# Patient Record
Sex: FEMALE | Race: WHITE | NOT HISPANIC OR LATINO | Employment: FULL TIME | URBAN - METROPOLITAN AREA
[De-identification: names, ages, dates, MRNs, and addresses within clinical notes are randomized per-mention and may not be internally consistent; named-entity substitution may affect disease eponyms.]

---

## 2022-11-10 ENCOUNTER — HOSPITAL ENCOUNTER (OUTPATIENT)
Dept: RADIOLOGY | Facility: HOSPITAL | Age: 47
Discharge: HOME | End: 2022-11-10
Attending: PHYSICIAN ASSISTANT
Payer: COMMERCIAL

## 2022-11-10 ENCOUNTER — OFFICE VISIT (OUTPATIENT)
Dept: SPORTS MEDICINE | Facility: CLINIC | Age: 47
End: 2022-11-10
Payer: COMMERCIAL

## 2022-11-10 VITALS
HEIGHT: 62 IN | HEART RATE: 55 BPM | WEIGHT: 124 LBS | DIASTOLIC BLOOD PRESSURE: 73 MMHG | SYSTOLIC BLOOD PRESSURE: 107 MMHG | BODY MASS INDEX: 22.82 KG/M2

## 2022-11-10 DIAGNOSIS — M79.661 PAIN IN RIGHT SHIN: ICD-10-CM

## 2022-11-10 DIAGNOSIS — M79.89 PAIN AND SWELLING OF RIGHT LOWER LEG: ICD-10-CM

## 2022-11-10 DIAGNOSIS — M25.571 RIGHT ANKLE PAIN, UNSPECIFIED CHRONICITY: Primary | ICD-10-CM

## 2022-11-10 DIAGNOSIS — M79.661 PAIN AND SWELLING OF RIGHT LOWER LEG: ICD-10-CM

## 2022-11-10 DIAGNOSIS — M25.571 RIGHT ANKLE PAIN, UNSPECIFIED CHRONICITY: ICD-10-CM

## 2022-11-10 PROCEDURE — 73590 XR TIBIA FIBULA 2 VIEW RIGHT: ICD-10-PCS | Mod: 26,RT,, | Performed by: RADIOLOGY

## 2022-11-10 PROCEDURE — 3078F PR MOST RECENT DIASTOLIC BLOOD PRESSURE < 80 MM HG: ICD-10-PCS | Mod: CPTII,S$GLB,, | Performed by: PHYSICIAN ASSISTANT

## 2022-11-10 PROCEDURE — 99999 PR PBB SHADOW E&M-EST. PATIENT-LVL III: ICD-10-PCS | Mod: PBBFAC,,, | Performed by: PHYSICIAN ASSISTANT

## 2022-11-10 PROCEDURE — 99204 OFFICE O/P NEW MOD 45 MIN: CPT | Mod: S$GLB,,, | Performed by: PHYSICIAN ASSISTANT

## 2022-11-10 PROCEDURE — 1159F PR MEDICATION LIST DOCUMENTED IN MEDICAL RECORD: ICD-10-PCS | Mod: CPTII,S$GLB,, | Performed by: PHYSICIAN ASSISTANT

## 2022-11-10 PROCEDURE — 73590 X-RAY EXAM OF LOWER LEG: CPT | Mod: TC,RT

## 2022-11-10 PROCEDURE — 73610 XR ANKLE COMPLETE 3 VIEW RIGHT: ICD-10-PCS | Mod: 26,RT,, | Performed by: RADIOLOGY

## 2022-11-10 PROCEDURE — 73610 X-RAY EXAM OF ANKLE: CPT | Mod: 26,RT,, | Performed by: RADIOLOGY

## 2022-11-10 PROCEDURE — 1159F MED LIST DOCD IN RCRD: CPT | Mod: CPTII,S$GLB,, | Performed by: PHYSICIAN ASSISTANT

## 2022-11-10 PROCEDURE — 3074F PR MOST RECENT SYSTOLIC BLOOD PRESSURE < 130 MM HG: ICD-10-PCS | Mod: CPTII,S$GLB,, | Performed by: PHYSICIAN ASSISTANT

## 2022-11-10 PROCEDURE — 99999 PR PBB SHADOW E&M-EST. PATIENT-LVL III: CPT | Mod: PBBFAC,,, | Performed by: PHYSICIAN ASSISTANT

## 2022-11-10 PROCEDURE — 99204 PR OFFICE/OUTPT VISIT, NEW, LEVL IV, 45-59 MIN: ICD-10-PCS | Mod: S$GLB,,, | Performed by: PHYSICIAN ASSISTANT

## 2022-11-10 PROCEDURE — 1160F RVW MEDS BY RX/DR IN RCRD: CPT | Mod: CPTII,S$GLB,, | Performed by: PHYSICIAN ASSISTANT

## 2022-11-10 PROCEDURE — 73610 X-RAY EXAM OF ANKLE: CPT | Mod: TC,RT

## 2022-11-10 PROCEDURE — 3008F PR BODY MASS INDEX (BMI) DOCUMENTED: ICD-10-PCS | Mod: CPTII,S$GLB,, | Performed by: PHYSICIAN ASSISTANT

## 2022-11-10 PROCEDURE — 3078F DIAST BP <80 MM HG: CPT | Mod: CPTII,S$GLB,, | Performed by: PHYSICIAN ASSISTANT

## 2022-11-10 PROCEDURE — 3008F BODY MASS INDEX DOCD: CPT | Mod: CPTII,S$GLB,, | Performed by: PHYSICIAN ASSISTANT

## 2022-11-10 PROCEDURE — 73590 X-RAY EXAM OF LOWER LEG: CPT | Mod: 26,RT,, | Performed by: RADIOLOGY

## 2022-11-10 PROCEDURE — 1160F PR REVIEW ALL MEDS BY PRESCRIBER/CLIN PHARMACIST DOCUMENTED: ICD-10-PCS | Mod: CPTII,S$GLB,, | Performed by: PHYSICIAN ASSISTANT

## 2022-11-10 PROCEDURE — 3074F SYST BP LT 130 MM HG: CPT | Mod: CPTII,S$GLB,, | Performed by: PHYSICIAN ASSISTANT

## 2022-11-10 RX ORDER — MELOXICAM 15 MG/1
15 TABLET ORAL DAILY
Qty: 30 TABLET | Refills: 1 | Status: SHIPPED | OUTPATIENT
Start: 2022-11-10

## 2022-11-10 NOTE — PROGRESS NOTES
CC: right ankle/lower leg pain and swelling    Beth Cardenas is a 47 y.o. Female who presents today for initial evaluation of right ankle and lower leg/shin pain.  She is a very active individual and rides her Peloton about 250 miles per week.  She reports that she recently began getting back into running to train for a triathlon after not having ran for about 2 years. This past  she began experiencing acute onset medially based right ankle and lower shin pain after a run.  She denies any recent falls, injuries, or trauma.  Since the pain began, she has had significant difficulty bearing weight along with weakness of the right ankle.  She has been resting, icing, and elevating the right leg and taking Motrin for pain.  She has not noticed any improvements in pain or swelling since this began. No prior injuries or surgeries on her right lower extremity.     Is affecting ADLs.       PAST MEDICAL HISTORY: History reviewed. No pertinent past medical history.  PAST SURGICAL HISTORY:   Past Surgical History:   Procedure Laterality Date     SECTION      TUBAL LIGATION       FAMILY HISTORY:   Family History   Problem Relation Age of Onset    Emphysema Father     Lung disease Father         double lung transplant     SOCIAL HISTORY:   Social History     Socioeconomic History    Marital status:    Tobacco Use    Smoking status: Never   Substance and Sexual Activity    Alcohol use: No     Alcohol/week: 0.0 standard drinks    Drug use: No    Sexual activity: Yes     Partners: Male     Birth control/protection: Surgical       MEDICATIONS:   Current Outpatient Medications:     clindamycin (CLEOCIN T) 1 % Swab, Apply topically 2 (two) times daily. Apply to affected area, Disp: , Rfl: 2    SOOLANTRA 1 % Crea, Apply 1 application topically 2 (two) times daily. Apply to affected area, Disp: , Rfl: 0    meloxicam (MOBIC) 15 MG tablet, Take 1 tablet (15 mg total) by mouth once daily., Disp: 30 tablet, Rfl:  "1  ALLERGIES: Review of patient's allergies indicates:  No Known Allergies    VITAL SIGNS: /73   Pulse (!) 55   Ht 5' 2" (1.575 m)   Wt 56.2 kg (124 lb)   BMI 22.68 kg/m²      Review of Systems   Constitution: Negative. Negative for chills, fever and night sweats.   HENT: Negative for congestion and headaches.    Eyes: Negative for blurred vision, left vision loss and right vision loss.   Cardiovascular: Negative for chest pain and syncope.   Respiratory: Negative for cough and shortness of breath.    Endocrine: Negative for polydipsia, polyphagia and polyuria.   Hematologic/Lymphatic: Negative for bleeding problem. Does not bruise/bleed easily.   Skin: Negative for dry skin, itching and rash.   Musculoskeletal: Negative for falls and muscle weakness.   Gastrointestinal: Negative for abdominal pain and bowel incontinence.   Genitourinary: Negative for bladder incontinence and nocturia.   Neurological: Negative for disturbances in coordination, loss of balance and seizures.   Psychiatric/Behavioral: Negative for depression. The patient does not have insomnia.    Allergic/Immunologic: Negative for hives and persistent infections.       PHYSICAL EXAMINATION    General:  The patient is alert and oriented x 3.  Mood is pleasant.  Observation of ears, eyes and nose reveal no gross abnormalities.  No labored breathing observed.    right Foot and Ankle Exam    INSPECTION:      ALIGNMENT:  Gait:    Antalgic   Hindfoot  Normal    Scars:   None    Midfoot: Normal  Swelling:  Mild     Forefoot: Normal  Color:   Normal      Atrophy:  None    Collective Ankle-Hindfoot Alignment    Heel / Toe Walking: + sway    Good -plantigrade (PG), well aligned                TENDERNESS:  lATERAL:    anterior:  Sinus tarsi:  None  Anteromedial joint line:  +  Syndesmosis:  none  Anterolateral joint line:  none  ATFL:   none  Talonavicular:    none   CFL:   none  Anterior tibialis:   none  Anterolateral gutter: none  Extensor " tendons:   none  Fibula:   none  Peroneal tendons: none  POSTERIOR:  Peroneal tubercle.  None  Medial/lateral achilles:   none       Medial/lateral achilles insertion: none  MEDIAL:      Deltoid:  none  CALCANEUS:  Malleolus:  +  Retrocalcaneal:   none  PTT:   none  Medial achilles:   none  Navicular:  none  Lateral achilles:   none       Calcaneal tuberosity:   none  FOOT:    Calcaneal cuboid  none MT / MT heads:  none   Navicular   none  Medial cord origin PF:  none  Cuneiforms:   none  Web space:   none  Lisfranc    none  Tarsal tunnel:   none  Base of the fifth metatarsal  none Tinels sign   neg        RANGE OF MOTION:  RIGHT/ LEFT   STRENGTH: (affected)  Ankle DF/PF:  15/45  15/45    Anterior tibialis: 4/5*     Eversion/Inversion: 15*/25 15/25  Posterior tibialis: 5/5   Midfoot ABD/ADD: 10*/10 10/10  Gastroc-soleus: 5/5   First MTP DF/PF: 60/25 60/25  Peroneals:  4/5*         EHL:   5/5   (* = pain)     FHL:   5/5         (* = pain)      SPECIAL TESTS:   ANKLE INSTABILITY: (*pain)    Anterior drawer:   Normal     (C-W contralateral side)     Inversion:   30°     Eversion  10°            Collective Instability: (Ant-post and varus-valgus)     Stable        PROVOCATIVE TESTING:    Forced DF/ER: + pain at syndesmosis.    Mid-leg squeeze  + pain at syndesmosis    Forced DF:  No pain anterior joint line.      Forced PF:  No pain posterior ankle.     Forced INV:  No pain present laterally    Forced EV:  + pain medial     Winstons sign: Normal ankle plantar flexion.     Resisted peroneal No subluxation or pain    1st-2nd MT toggle No pain at Lisfranc    MT-T torque  No pain at Lisfranc     NEUROLOGIC TESTING:  All dermatomes foot, ankle and leg have normal sensation light touch  Ankle Reflexes 2+, symmetric   Negative Babinski and No Clonus    VASCULAR:  2+ pulses PT/DT with brisk capillary refill toes.    Other Findings:  N/A    XRAYS right ankle (11/10/2022):  Right Ankle 3 views (AP, lateral,mortise)  were  ordered and reviewed.   No evidence of any fracture or dislocation.  The osseous structures appear well mineralized and well aligned. No mortise displacement.    X-rays right tibia/fibula (11/10/2022):  No acute fracture, dislocation, or aggressive bone lesions seen.  Bones well mineralized.  There is some mild to moderate degenerative change of the distal tib-fib joint.    ASSESSMENT:   Right ankle/lower shin pain and swelling - possible tibial stress fracture      PLAN:    I made the decision to obtain old records of the patient including previous notes and imaging. New imaging was ordered today of the extremity or extremities evaluated. I independently reviewed and interpreted the radiographs and/or MRIs today as well as prior imaging, if available.    We discussed at length different conservative management options. These include anti-inflammatories, acetaminophen, rest, ice, heat, formal physical therapy including strengthening and stretching exercises, home exercise programs, dry needling, and immobilization/boot wear.     Harshil Logan, performed a custom orthotic / brace adjustment, fitting and training with the patient. The patient demonstrated understanding and proper care. This was performed for 8 minutes.     Prescription for Meloxicam 15 mg one p.o. QD prn pain provided today. Advised patient to refrain from taking other NSAIDs while taking Meloxicam, however may alternate with acetaminophen if needed.    Advised patient to rest, ice, and elevate the right lower leg and avoid high impact activity for at least 2 weeks.  Recommend she avoid riding her stationary bike for now as well for a week, then may attempt this in a light capacity if she can do so pain-free.    Follow-up in 2 weeks.        All questions were answered. Instructed patient to call with questions or concerns in the interim.       Medical Dictation software was used during the dictation of portions or the entirety of this medical  record.  Phonetic or grammatic errors may exist due to the use of this software. For clarification, refer to the author of the document.

## 2025-04-15 ENCOUNTER — OFFICE VISIT (OUTPATIENT)
Dept: OBSTETRICS AND GYNECOLOGY | Facility: CLINIC | Age: 50
End: 2025-04-15
Payer: COMMERCIAL

## 2025-04-15 VITALS
HEIGHT: 62 IN | BODY MASS INDEX: 19.55 KG/M2 | SYSTOLIC BLOOD PRESSURE: 98 MMHG | DIASTOLIC BLOOD PRESSURE: 70 MMHG | WEIGHT: 106.25 LBS

## 2025-04-15 DIAGNOSIS — Z12.31 BREAST CANCER SCREENING BY MAMMOGRAM: ICD-10-CM

## 2025-04-15 DIAGNOSIS — Z01.419 WELL WOMAN EXAM WITH ROUTINE GYNECOLOGICAL EXAM: Primary | ICD-10-CM

## 2025-04-15 PROCEDURE — 99999 PR PBB SHADOW E&M-EST. PATIENT-LVL III: CPT | Mod: PBBFAC,,, | Performed by: STUDENT IN AN ORGANIZED HEALTH CARE EDUCATION/TRAINING PROGRAM

## 2025-04-15 PROCEDURE — 99386 PREV VISIT NEW AGE 40-64: CPT | Mod: S$GLB,,, | Performed by: STUDENT IN AN ORGANIZED HEALTH CARE EDUCATION/TRAINING PROGRAM

## 2025-04-15 PROCEDURE — 3008F BODY MASS INDEX DOCD: CPT | Mod: CPTII,S$GLB,, | Performed by: STUDENT IN AN ORGANIZED HEALTH CARE EDUCATION/TRAINING PROGRAM

## 2025-04-15 PROCEDURE — 3078F DIAST BP <80 MM HG: CPT | Mod: CPTII,S$GLB,, | Performed by: STUDENT IN AN ORGANIZED HEALTH CARE EDUCATION/TRAINING PROGRAM

## 2025-04-15 PROCEDURE — 3074F SYST BP LT 130 MM HG: CPT | Mod: CPTII,S$GLB,, | Performed by: STUDENT IN AN ORGANIZED HEALTH CARE EDUCATION/TRAINING PROGRAM

## 2025-04-15 PROCEDURE — 1159F MED LIST DOCD IN RCRD: CPT | Mod: CPTII,S$GLB,, | Performed by: STUDENT IN AN ORGANIZED HEALTH CARE EDUCATION/TRAINING PROGRAM

## 2025-04-15 NOTE — PROGRESS NOTES
[facility]  Obstetrics & Gynecology      History of Present Illness:   Annual exam. Has not been seen for preventative care other than dermatology in about 9 years.   Menstrual History:  reports periods are regular. Denies hx of dysmenorrhea or menorrhagia. No perimenopausal symptoms  Obstetric Hx: , CSx4.  LMP: 3/23  STD/STI Hx: Denies any history of STD's  Contraception Hx: BTL  Sexually Active: one male partner  Family history: Denies any personal or family history of GYN/colon cancers   Social: Wears seatbelts. Exercises. Feels safe at home. No severe depressive episodes recently. No issues with tobacco/vaping, EtOH, illicit drugs.  Last pap: 2016normal.   Last mammo: none  Colonoscopy: none  Vitamins: taking      Current Outpatient Medications on File Prior to Visit   Medication Sig    [DISCONTINUED] clindamycin (CLEOCIN T) 1 % Swab Apply topically 2 (two) times daily. Apply to affected area    [DISCONTINUED] meloxicam (MOBIC) 15 MG tablet Take 1 tablet (15 mg total) by mouth once daily.    [DISCONTINUED] SOOLANTRA 1 % Crea Apply 1 application topically 2 (two) times daily. Apply to affected area     No current facility-administered medications on file prior to visit.       Review of patient's allergies indicates:  No Known Allergies    No past medical history on file.  OB History    Para Term  AB Living   4 4 4 0 0 4   SAB IAB Ectopic Multiple Live Births   0 0 0 0 0      # Outcome Date GA Lbr Savage/2nd Weight Sex Type Anes PTL Lv   4 Term            3 Term            2 Term            1 Term              Past Surgical History:   Procedure Laterality Date     SECTION      TUBAL LIGATION       Family History       Problem Relation (Age of Onset)    Emphysema Father    Lung disease Father          Tobacco Use    Smoking status: Never    Smokeless tobacco: Not on file   Substance and Sexual Activity    Alcohol use: No     Alcohol/week: 0.0 standard drinks of alcohol    Drug use: No     Sexual activity: Yes     Partners: Male     Birth control/protection: Surgical     Review of Systems   Constitutional:  Negative for activity change, appetite change, fever and unexpected weight change.   Gastrointestinal:  Negative for abdominal pain, blood in stool, constipation, diarrhea, nausea and vomiting.   Genitourinary:  Negative for dysmenorrhea, dyspareunia, dysuria, hematuria, menorrhagia, pelvic pain, vaginal bleeding, vaginal discharge, vaginal pain, postcoital bleeding and vaginal odor.   Integumentary:  Negative for breast mass.   Breast: Negative for lump and mass    Objective:     Vital Signs (Most Recent):    Vital Signs (24h Range):  [unfilled]        There is no height or weight on file to calculate BMI.  No LMP recorded.    Physical Exam:   Constitutional: She is oriented to person, place, and time. She appears well-developed and well-nourished. No distress.    HENT:   Head: Normocephalic and atraumatic.    Eyes: Conjunctivae and EOM are normal.    Neck: No thyromegaly present.    Cardiovascular:  Normal rate.             Pulmonary/Chest: Effort normal. No respiratory distress. Right breast exhibits no inverted nipple, no mass, no nipple discharge, no skin change, no tenderness, presence, no bleeding and no swelling. Left breast exhibits no inverted nipple, no mass, no nipple discharge, no skin change, no tenderness, presence, no bleeding and no swelling.        Abdominal: Soft. She exhibits no distension. There is no abdominal tenderness.     Genitourinary:    Urethra, vagina, uterus, right adnexa and left adnexa normal.   The external female genitalia was normal.   Cervix is normal.           Musculoskeletal: Normal range of motion and moves all extremeties. No tenderness or edema.       Neurological: She is alert and oriented to person, place, and time.    Skin: Skin is warm and dry. No rash noted.    Psychiatric: She has a normal mood and affect. Her behavior is normal.            Assessment/Plan:     WWE  - Postmenopausal. No issues.  - Labs utd  -  Mammogram scheduled  - Colonoscopy or cologard declined  - Pap and co test collected  - Vitamin D and Calcium recs given  - CBE normal. Physical exam normal. ALENA Koo MD  Obstetrics & Gynecology  [facility]

## 2025-04-25 ENCOUNTER — RESULTS FOLLOW-UP (OUTPATIENT)
Dept: OBSTETRICS AND GYNECOLOGY | Facility: CLINIC | Age: 50
End: 2025-04-25

## 2025-05-12 ENCOUNTER — TELEPHONE (OUTPATIENT)
Dept: OTOLARYNGOLOGY | Facility: CLINIC | Age: 50
End: 2025-05-12
Payer: COMMERCIAL

## 2025-05-12 DIAGNOSIS — H91.90 HEARING LOSS, UNSPECIFIED HEARING LOSS TYPE, UNSPECIFIED LATERALITY: Primary | ICD-10-CM

## 2025-05-13 ENCOUNTER — CLINICAL SUPPORT (OUTPATIENT)
Facility: CLINIC | Age: 50
End: 2025-05-13
Payer: COMMERCIAL

## 2025-05-13 ENCOUNTER — TELEPHONE (OUTPATIENT)
Dept: OTOLARYNGOLOGY | Facility: CLINIC | Age: 50
End: 2025-05-13

## 2025-05-13 ENCOUNTER — OFFICE VISIT (OUTPATIENT)
Dept: OTOLARYNGOLOGY | Facility: CLINIC | Age: 50
End: 2025-05-13
Payer: COMMERCIAL

## 2025-05-13 VITALS
HEART RATE: 74 BPM | DIASTOLIC BLOOD PRESSURE: 68 MMHG | SYSTOLIC BLOOD PRESSURE: 100 MMHG | HEIGHT: 62 IN | WEIGHT: 106.25 LBS | BODY MASS INDEX: 19.55 KG/M2

## 2025-05-13 DIAGNOSIS — Z01.10 NORMAL HEARING EXAM: ICD-10-CM

## 2025-05-13 DIAGNOSIS — H93.8X9 SENSATION OF FULLNESS IN EAR, UNSPECIFIED LATERALITY: Primary | ICD-10-CM

## 2025-05-13 DIAGNOSIS — H90.42 SENSORINEURAL HEARING LOSS (SNHL) OF LEFT EAR WITH UNRESTRICTED HEARING OF RIGHT EAR: Primary | ICD-10-CM

## 2025-05-13 DIAGNOSIS — H93.293 ABNORMAL AUDITORY PERCEPTION OF BOTH EARS: ICD-10-CM

## 2025-05-13 DIAGNOSIS — H91.90 HEARING LOSS, UNSPECIFIED HEARING LOSS TYPE, UNSPECIFIED LATERALITY: Primary | ICD-10-CM

## 2025-05-13 DIAGNOSIS — H69.90 DYSFUNCTION OF EUSTACHIAN TUBE, UNSPECIFIED LATERALITY: ICD-10-CM

## 2025-05-13 PROCEDURE — 99203 OFFICE O/P NEW LOW 30 MIN: CPT | Mod: S$GLB,,,

## 2025-05-13 PROCEDURE — 92557 COMPREHENSIVE HEARING TEST: CPT | Mod: S$GLB,,, | Performed by: AUDIOLOGIST-HEARING AID FITTER

## 2025-05-13 PROCEDURE — 3074F SYST BP LT 130 MM HG: CPT | Mod: CPTII,S$GLB,,

## 2025-05-13 PROCEDURE — 1159F MED LIST DOCD IN RCRD: CPT | Mod: CPTII,S$GLB,,

## 2025-05-13 PROCEDURE — 3008F BODY MASS INDEX DOCD: CPT | Mod: CPTII,S$GLB,,

## 2025-05-13 PROCEDURE — 3078F DIAST BP <80 MM HG: CPT | Mod: CPTII,S$GLB,,

## 2025-05-13 PROCEDURE — 92567 TYMPANOMETRY: CPT | Mod: S$GLB,,, | Performed by: AUDIOLOGIST-HEARING AID FITTER

## 2025-05-13 NOTE — PROGRESS NOTES
Subjective:   Beth Cardenas is a 49 y.o. female who was self-referred for aural fullness. She has no past medical history on file. She reports aural fullness and an echo in bilateral ears, left possibly worse than right but unsure. This has been present for a few months. Claritin and Flonase do not help. Was prescribed an abx and prednisone by urgent care which did not help. She does feel like she cannot pop them. She does feel that she can hear herself breathing and talking like an echo. Recently lost about 10-15 pounds over the past few months (purposefully). She is also inquiring about if drinking 2 celsius per day can cause aural fullness. Denies noticeable hearing loss or tinnitus. No otalgia or dizziness.     There is not a family history of hearing loss at a young age. There is not a prior history of ear surgery. There is not a prior history of ear infections. There is not a history of ear trauma.     Past Medical History  She has no past medical history on file.    Past Surgical History  She has a past surgical history that includes  section and Tubal ligation.    Family History  Her family history includes Emphysema in her father; Lung disease in her father.    Social History  She reports that she has never smoked. She does not have any smokeless tobacco history on file. She reports that she does not drink alcohol and does not use drugs.    Allergies  She is allergic to bactericin [bacitracin], grass pollen- grass standard, and hydrocortisone.    Medications  She currently has no medications in their medication list.  Review of Systems     Constitutional: Negative for appetite change, chills, fatigue, fever and unexpected weight loss.      HENT: Positive for postnasal drip.  Negative for ear discharge, ear pain, hearing loss and ringing in the ears.      Eyes:  Negative for change in eyesight, eye drainage, eye itching and photophobia.     Respiratory:  Negative for cough, shortness of breath,  sleep apnea, snoring and wheezing.      Cardiovascular:  Negative for chest pain, foot swelling, irregular heartbeat and swollen veins.     Gastrointestinal:  Negative for abdominal pain, acid reflux, constipation, diarrhea, heartburn and vomiting.     Genitourinary: Negative for difficulty urinating, sexual problems and frequent urination.     Musc: Negative for aching joints, aching muscles, back pain and neck pain.     Skin: Negative for rash.     Allergy: Negative for food allergies and seasonal allergies.     Endocrine: Negative for cold intolerance and heat intolerance.      Neurological: Negative for dizziness, headaches, light-headedness, seizures and tremors.      Hematologic: Negative for bruises/bleeds easily and swollen glands.      Psychiatric: Negative for decreased concentration, depression, nervous/anxious and sleep disturbance.          Objective:     Constitutional:   She appears well-developed and well-nourished.     Head:  Normocephalic.     Ears:    Right Ear: No drainage, swelling or tenderness. Tympanic membrane is not scarred, not perforated, not retracted and not bulging. No middle ear effusion.   Left Ear: No drainage, swelling or tenderness. Tympanic membrane is retracted (Mild). Tympanic membrane is not scarred, not perforated and not bulging.  No middle ear effusion.     Mouth/Throat  Oropharynx clear and moist without lesions or asymmetry and normal uvula midline.     Pulmonary/Chest:   Effort normal.     Procedure  None    Imaging  No pertinent imaging available.  Audiogram    I have independently reviewed the following audiogram and reviewed the report. Findings as follows:     Pure Tone Audiometry: Asymmetric  Right - Normal (0-25 dB) hearing  Left - Normal (0-25 dB) to Mild (26-40 dB) mixed hearing loss with 10 dB air-bone gap around 4000Hz     Tympanometry  Right: Type A slight negative pressure   Left: Type A slight negative pressure     Word Discrimination Score  Right:  100%  Left 100%    Acoustic Reflexes  Right Stimulation - Right reflex: DNT   Left Reflex: DNT  Left Stimulation - Right reflex: DNT   Left Reflex: DNT       Assessment:     1. Sensation of fullness in ear, unspecified laterality    2. Dysfunction of Eustachian tube, unspecified laterality    3. Normal hearing exam      Plan:   Beth was seen today for ear problem.    Diagnoses and all orders for this visit:    Sensation of fullness in ear, unspecified laterality  Dysfunction of Eustachian tube, unspecified laterality  Normal hearing exam  - Essentially normal hearing exam.   - Possible patulous eustachian tube. Will trial stopping nasal decongestants to determine if that helps. Dehydration (and increased caffeine intake) can worsen patulous so we discussed increasing water and decreasing caffeine. She will keep me updated via [facility].

## 2025-05-13 NOTE — PROGRESS NOTES
Jocelyne Phillip, CCC-A  Audiologist - [facility] [address] [place] [address]  [email or web address]  [phone]    Patient: Beht Cardenas   MRN: [id number]  [address]   Home Phone [phone]   Work Phone [phone]   Mobile [phone]   : 1975  CANTRELL: 2025      AUDIOLOGICAL EVALUATION    Mrs. Beth Cardenas was seen in the clinic today for an audiological evaluation.  Mrs. Cardenas reported a bilateral echo in her ears.  Mrs. Cardenas denied decreased hearing, tinnitus, dizziness, history of noise exposure, and family history of hearing loss.    Audiological testing revealed normal hearing sensitivity across all frequencies for the Right ear and a normal sloping to mild high-frequency sensorineural hearing loss for the Left ear.  A speech reception threshold was obtained at 20 dB HL for the Right ear and at 15 dB HL for the Left ear.  Speech discrimination was 100% for the Right ear and 100% for the Left ear.  There was a slight asymmetry noted between ears at 6000 Hz, with the Left ear poorer than the Right ear.    Tympanometry testing revealed a Type A tympanogram for the Right ear and a Type A tympanogram for the Left ear.        RECOMMENDATIONS:   It is recommended that Beth Cardenas:  Follow up medically with IRA Cruz PA-C.  Continue to receive audiological monitoring annually.  Use precaution and/or hearing protection in noisy environments.    If you should have any questions or concerns regarding the above information, please do not hesitate to contact me at [phone].      _______________________________  Jocelyne Phillip, CAM-A  Audiologist